# Patient Record
Sex: MALE | Race: BLACK OR AFRICAN AMERICAN | NOT HISPANIC OR LATINO | Employment: UNEMPLOYED | URBAN - METROPOLITAN AREA
[De-identification: names, ages, dates, MRNs, and addresses within clinical notes are randomized per-mention and may not be internally consistent; named-entity substitution may affect disease eponyms.]

---

## 2023-02-02 ENCOUNTER — TELEPHONE (OUTPATIENT)
Dept: UROLOGY | Facility: CLINIC | Age: 43
End: 2023-02-02

## 2023-02-02 NOTE — TELEPHONE ENCOUNTER
----- Message from Rakesh Mckeon sent at 2/2/2023  3:40 PM CST -----  Contact: self  Type: Needs Medical Advice  Who Called: Patient   Best Call Back Number: [phone]  Additional Information: Pt was admitted at [facility] and was told he has kidney stones and it is affecting him peeing plz call pt today to get him scheduled . Pt states he has [employer] insurance but doesn't have card on him. Pt states  he is in between trying to find another primary doctor. Thanks      
Centralized scheduling sent wrong date of birth and chart.  
Phoned patient no answer left message on voicemail to give the office a call back.  
DISPLAY PLAN FREE TEXT
DISPLAY PLAN FREE TEXT